# Patient Record
Sex: MALE | Race: WHITE | URBAN - METROPOLITAN AREA
[De-identification: names, ages, dates, MRNs, and addresses within clinical notes are randomized per-mention and may not be internally consistent; named-entity substitution may affect disease eponyms.]

---

## 2018-01-05 ENCOUNTER — TELEPHONE (OUTPATIENT)
Dept: DERMATOLOGY | Facility: AMBULATORY SURGERY CENTER | Age: 53
End: 2018-01-05

## 2018-01-05 NOTE — TELEPHONE ENCOUNTER
LVM regarding MOHs pre op assessment. Pt instructed to call back at earliest convenience.      Appointment on Mon 1/22 @ 1030  Site: L superior posterior helix

## 2018-01-15 ENCOUNTER — TELEPHONE (OUTPATIENT)
Dept: DERMATOLOGY | Facility: AMBULATORY SURGERY CENTER | Age: 53
End: 2018-01-15

## 2018-01-16 NOTE — TELEPHONE ENCOUNTER
Patient Appointment Date: Mon 1/22 @ [facility], 48 y.o., male  Is calling for their Mohs Pre-Op Assessment    does not have Hepatitis C   does not have HIV (If YES, set up consult appointment)  does confirm site (if pathology available)  does ID site. (Can they still visibly see the site)    Brief description of tumor: (symptoms, If prior treatment, duration)    Allergies: Allergies not on file      does not have an Electrical Implanted Device (Pacemaker, Defibrillator, AICD, brain stimulator)      does not need antibiotics      is not taking NSAIDs    is not taking aspirin      is not taking Garlic  is not taking Ginkgo  is not taking Ginseng  is not taking Fish oils  is not taking Vit E    does not take a blood thinner(i.e. Coumadin/Warfarin, Plavix, Brilinta, Pradaxa, Xarelto, Effient)    is not taking Coumadin/Warfarin (If taking needs to have PT/INR drawn and faxed results within a week of surgery)      Pre operative assessment questions asked to patient. Patient has a general understanding of the procedure, and has been versed that there will be local anesthesia used in the procedure and that He will be ok to drive themselves to and from the appointment.

## 2018-01-22 ENCOUNTER — OFFICE VISIT (OUTPATIENT)
Dept: DERMATOLOGY | Facility: AMBULATORY SURGERY CENTER | Age: 53
End: 2018-01-22

## 2018-01-22 VITALS
RESPIRATION RATE: 18 BRPM | WEIGHT: 162 LBS | OXYGEN SATURATION: 98 % | HEART RATE: 80 BPM | HEIGHT: 71 IN | SYSTOLIC BLOOD PRESSURE: 130 MMHG | DIASTOLIC BLOOD PRESSURE: 84 MMHG | TEMPERATURE: 97.7 F | BODY MASS INDEX: 22.68 KG/M2

## 2018-01-22 DIAGNOSIS — C44.219 BASAL CELL CARCINOMA OF HELIX OF LEFT EAR: Primary | ICD-10-CM

## 2018-01-22 NOTE — PROGRESS NOTES
This note is written by Francisco Rainey, as dictated by Thania Burns. Pennie Barrow MD.    CC: Basal cell carcinoma on the left posterior helix     History of present illness:     Shelley Brandon is a 48 y.o. male referred by Dr. Lissa Kwok. He has a biopsy-proven basal cell carcinoma on the left posterior helix. This is a new basal cell carcinoma present for more than six months described as a non-healing lesion with no prior treatment. Biopsy confirmed the diagnosis of basal cell carcinoma, and I reviewed the written pathology report. He is feeling well and in his usual state of health today. He has no pain, no current illnesses, no other skin concerns. His allergies, medications, medical, and social history are reviewed by me today. Exam:     He is an awake, alert, and oriented 48 y.o. male who appears well and in no distress. There is no preauricular, submandibular, or cervical lymphadenopathy. I examined his left ear. He has a 12 x 11 mm indistinct pink patch on his left posterior helix. He confirms location. Assessment/plan:    1. Basal cell carcinoma, left posterior helix. I discussed the diagnosis of basal cell carcinoma and summarized the pathology report. Mohs surgery is indicated by site, size, and poor definition. The procedure was discussed, verbal and written consent were obtained. I performed the procedure. Two stages were required to reach a tumor free plane. The surgical defect was managed with a full thickness skin graft. There were no complications. He will follow up in one week and as needed as the site heals. Indications, risks, and options were discussed with Shelley Brandon preoperatively. Risks including, but not limited to: pain, bleeding, infection, tumor recurrence, scarring and damage to motor and/or sensory nerves, were discussed. Shelley Brandon chose Mohs surgery. Shelley Brandon was an acceptable surgery candidate.     Shelley Branodn was placed in the appropriate position on the operating table in the Mohs surgery procedure room. The area was prepped and draped in the standard manner. Gentian violet was used to outline the clinical margins of the tumor. Local anesthesia was then obtained. The grossly visible tumor was then removed, an underlying layer was excised and mapped according to the Mohs technique, and the individual specimens examined microscopically. The process was repeated until microscopic examination of the tissue specimens confirmed a tumor-free plane. Hemostasis was obtained with electrosurgery and pressure. The wound was covered between stages with moist saline gauze. The wound management options of second intent healing, layered closure, local flap, or full thickness skin graft were discussed. Mr. Berhane Anand understands the aims, risks, alternatives, and possible complications and elects to proceed with a full thickness skin graft. Mr. Berhane Anand was placed in a supine position on the operating table in the Mohs surgery procedure room. The area was prepped and draped in the standard manner. A template of donor skin the size of the wound was drawn at the left postauricular. 1% lidocaine with epinephrine 1:100,000 was used to anesthetize the donor area. The graft was removed from the donor area and defatted. Hemostasis was obtained with spot electrocoagulation. The margins of the donor site were undermined and then the site was sutured with 4-0 polysorb sutures to approximate the skin edges. The defatted graft was placed on the recipient site and anchored in place with a running 5-0 fast absorbing gut suture around the margin. Steristrips were placed on the donor site. A pressure dressing utilizing Vaseline, Telfa, gauze and Coverroll was placed on the graft site. Wound care instructions (written and verbal) and a follow up appointment were given to the patient before discharge. Mr. Berhane Anand was discharged in good condition.                 2. History of nonmelanoma skin cancer. I discussed the diagnosis and recommend routine examinations with Dr. Jarrod Lopez for surveillance. The documentation recorded by the scribe accurately reflects the service I personally performed and the decisions made by me. [facility]   OFFICE PROCEDURE PROGRESS NOTE     Chart reviewed for the following:     Billy Pate MD, have reviewed the History, Physical and updated the Allergic reactions for [address] performed immediately prior to start of procedure:     Billy Quevedo. Pat Pate MD, have performed the following reviews on [place] prior to the start of the procedure:     * Patient was identified by name and date of birth   * Agreement on procedure being performed was verified   * Risks and Benefits explained to the patient   * Procedure site verified and marked as necessary   * Patient was positioned for comfort   * Consent was signed and verified     Time: 10:21 AM   Date of procedure: 1/22/2018  Procedure performed by: Susie Mathews.  Pat Pate MD   Provider assisted by: RN  Patient assisted by: self   How tolerated by patient: tolerated the procedure well with no complications   Comments: none

## 2018-01-22 NOTE — PATIENT INSTRUCTIONS
WOUND CARE INSTRUCTIONS    1. Keep the dressing clean and dry and do not remove for 24/48 hours. 2. Then change the dressing once a day as follows:  a. Wash hands before and after each dressing change. b. Remove dressing and wash site gently with mild soap and water, rinse, and pat dry.  c. Apply an ointment (Bacitracin, Polysporin, Neosporin, Petroleum jelly or Aquaphor). d. Apply a non-stick (Telfa) dressing or Band-Aid to cover the wound. Keep bandage on graft clean and dry for 1 week until follow up appointment. Remove pressure bandage on Wednesday and wash gently. Leave steri-strips in place until it naturally begins to peel off.       3. Watch for:  BLEEDING: A small amount of drainage may occur. If bleeding occurs, elevate and rest the surgery site. Apply gauze and steady pressure for 15 minutes. If bleeding continues, call this office. INFECTION: Signs of infection include increased redness, pain, warmth, drainage of pus, and fever. If this occurs, call this office. 4. Special Instructions (follow any that are checked):  · [x] You have stitches that DO NOT need to be removed. · [x] Avoid bending at the waist and heavy lifting for two days. · [x] Sleep with your head elevated for the next two nights. · [x] Rest the surgery site and keep it elevated as much as possible for two days. · [x] You may apply an ice-pack for 10-15 minutes every waking hour for the rest of the day. · [] Eat a soft diet and avoid hot food and hot drinks for the rest of the day. · [] Other instructions: Follow up as directed. Take Tylenol or Ibuprofen for pain as needed. Once the site is healed with no remaining bandages or open areas, protect your surgical site and scar from the sun, as this area will be more sensitive. Use a broad spectrum sunscreen SPF 30 or higher daily, and a chemical free product (one containing zinc oxide or titanium dioxide) is a good choice if the area is sensitive.     You may begin to gently massage the surgical site in 2-3 weeks, rubbing in a circular motion along the scar. This can help reduce swelling and thickness of a scar. A scar cream may be used beginnning 1 month after the surgery. If you have any questions or concerns, please call our office Monday through Friday at [phone].

## 2018-01-22 NOTE — PROGRESS NOTES
Pre-op: Patient presents today for the evaluation of BCC to the L posterior helix. Procedure explained with full understanding. Vitals:    01/22/18 1034   BP: 130/84   Pulse: 80   Resp: 18   Temp: 97.7 °F (36.5 °C)   TempSrc: Oral   SpO2: 98%   Weight: 73.5 kg (162 lb)   Height: 5' 11\" (1.803 m)     preoperatively, will continue to monitor. Post-op: Written and verbal post-op wound care instructions given to patient with full understanding of care. Surgical wound bandaged with Vaseline, Telfa, 2x2 gauze, and coverall tape. All questions and concerns addressed. Vitals stable postoperatively.

## 2018-01-29 ENCOUNTER — OFFICE VISIT (OUTPATIENT)
Dept: DERMATOLOGY | Facility: AMBULATORY SURGERY CENTER | Age: 53
End: 2018-01-29

## 2018-01-29 VITALS
OXYGEN SATURATION: 98 % | HEIGHT: 71 IN | RESPIRATION RATE: 16 BRPM | WEIGHT: 162.04 LBS | BODY MASS INDEX: 22.69 KG/M2 | HEART RATE: 79 BPM

## 2018-01-29 DIAGNOSIS — Z94.5 HISTORY OF SKIN GRAFT: ICD-10-CM

## 2018-01-29 DIAGNOSIS — Z85.828 HISTORY OF BASAL CELL CARCINOMA EXCISION: Primary | ICD-10-CM

## 2018-01-29 DIAGNOSIS — Z98.890 HISTORY OF BASAL CELL CARCINOMA EXCISION: Primary | ICD-10-CM

## 2018-01-29 NOTE — PROGRESS NOTES
This note was written by Deuce Armenta, as dictated by Yasir Engle MD.     Chief complaint: Wound check on the left posterior helix     HPI: Filipe Lee presents for wound check following Mohs surgery performed on 1/22/18. I treated a basal cell carcinoma on his left posterior helix. The surgical site was managed with a full thickness skin graft. He reports minor bleeding from the graft site one day, none recently. He's had no pain. Exam: The graft site was examined. There is not evidence of infection. There is not erythema. There is not edema. Assessment/Plan:      1. Wound check, left posterior helix. The graft site is healing well. Additional care was reviewed. I recommended the use of Vaseline for an additional 10 days on the graft site. Follow up will be as needed. The documentation recorded by the scribe accurately reflects the service I personally performed and the decisions made by me.

## 2023-11-26 ENCOUNTER — HOSPITAL ENCOUNTER (EMERGENCY)
Facility: HOSPITAL | Age: 58
Discharge: HOME | End: 2023-11-26
Attending: STUDENT IN AN ORGANIZED HEALTH CARE EDUCATION/TRAINING PROGRAM
Payer: COMMERCIAL

## 2023-11-26 VITALS
TEMPERATURE: 97.8 F | BODY MASS INDEX: 23.3 KG/M2 | DIASTOLIC BLOOD PRESSURE: 98 MMHG | WEIGHT: 166.45 LBS | HEART RATE: 90 BPM | HEIGHT: 71 IN | RESPIRATION RATE: 16 BRPM | SYSTOLIC BLOOD PRESSURE: 144 MMHG | OXYGEN SATURATION: 100 %

## 2023-11-26 DIAGNOSIS — R21 RASH AND OTHER NONSPECIFIC SKIN ERUPTION: Primary | ICD-10-CM

## 2023-11-26 LAB
BASOPHILS # BLD: 0 K/UL (ref 0–0.1)
BASOPHILS NFR BLD: 0 % (ref 0–1)
DIFFERENTIAL METHOD BLD: ABNORMAL
EOSINOPHIL # BLD: 0.2 K/UL (ref 0–0.4)
EOSINOPHIL NFR BLD: 9 % (ref 0–7)
ERYTHROCYTE [DISTWIDTH] IN BLOOD BY AUTOMATED COUNT: 12.7 % (ref 11.5–14.5)
HCT VFR BLD AUTO: 45.4 % (ref 36.6–50.3)
HGB BLD-MCNC: 15.4 G/DL (ref 12.1–17)
IMM GRANULOCYTES # BLD AUTO: 0 K/UL (ref 0–0.04)
IMM GRANULOCYTES NFR BLD AUTO: 1 % (ref 0–0.5)
LYMPHOCYTES # BLD: 0.2 K/UL (ref 0.8–3.5)
LYMPHOCYTES NFR BLD: 10 % (ref 12–49)
MCH RBC QN AUTO: 31.2 PG (ref 26–34)
MCHC RBC AUTO-ENTMCNC: 33.9 G/DL (ref 30–36.5)
MCV RBC AUTO: 91.9 FL (ref 80–99)
MONOCYTES # BLD: 0.3 K/UL (ref 0–1)
MONOCYTES NFR BLD: 12 % (ref 5–13)
NEUTS SEG # BLD: 1.7 K/UL (ref 1.8–8)
NEUTS SEG NFR BLD: 68 % (ref 32–75)
NRBC # BLD: 0 K/UL (ref 0–0.01)
NRBC BLD-RTO: 0 PER 100 WBC
PLATELET # BLD AUTO: 140 K/UL (ref 150–400)
PMV BLD AUTO: 9 FL (ref 8.9–12.9)
RBC # BLD AUTO: 4.94 M/UL (ref 4.1–5.7)
RBC MORPH BLD: ABNORMAL
WBC # BLD AUTO: 2.4 K/UL (ref 4.1–11.1)

## 2023-11-26 PROCEDURE — 36415 COLL VENOUS BLD VENIPUNCTURE: CPT

## 2023-11-26 PROCEDURE — 99283 EMERGENCY DEPT VISIT LOW MDM: CPT

## 2023-11-26 PROCEDURE — 85025 COMPLETE CBC W/AUTO DIFF WBC: CPT

## 2023-11-26 RX ORDER — CETIRIZINE HYDROCHLORIDE 10 MG/1
10 TABLET ORAL DAILY
Qty: 30 TABLET | Refills: 0 | Status: SHIPPED | OUTPATIENT
Start: 2023-11-26 | End: 2023-12-26

## 2023-11-26 RX ORDER — PREDNISONE 10 MG/1
TABLET ORAL
Qty: 30 TABLET | Refills: 0 | Status: SHIPPED | OUTPATIENT
Start: 2023-11-26

## 2023-11-26 ASSESSMENT — LIFESTYLE VARIABLES
HOW OFTEN DO YOU HAVE A DRINK CONTAINING ALCOHOL: 4 OR MORE TIMES A WEEK
HOW MANY STANDARD DRINKS CONTAINING ALCOHOL DO YOU HAVE ON A TYPICAL DAY: 1 OR 2

## 2023-11-26 NOTE — ED NOTES
Discharge instructions given to patient, patient verbalized understanding      Brynn Hdez RN  11/26/23 7045

## 2023-11-26 NOTE — DISCHARGE INSTRUCTIONS
Discussed visit today. We will start you on a longer steroid and have you follow-up with Dermatology for further evaluation. Return to the ER with any worsening of symptoms.

## 2023-11-26 NOTE — ED PROVIDER NOTES
[facility]  EMERGENCY DEPARTMENT ENCOUNTER      Pt Name: Taina Alegria  MRN: [id number]  [address] 1965  Date of evaluation: 11/26/2023  Provider: Eric Lee PA-C    CHIEF COMPLAINT       Chief Complaint   Patient presents with    Rash         HISTORY OF PRESENT ILLNESS    Patient is an 62 y.o. male with history of basel cell carcinoma removal who presents to the ER with reports of a rash since 11/13. Patient reports the rash was initially to his chest, back, upper and lower extremities. [facility] was [facility] at [facility] and was given Pepcid, hydroxyzine, and Bactrim for 7 days. Patient reports his rash started to get better, but after stopping the medications it came back. Patient reports he went to [facility] today and was told to come to the ER to rule out allergic reaction to the medication since the rash came back. Patient reports that the rash has been erythematous and itchy. Patient reports the rash on his lower extremities is gone. Patient reports the rash got better but then came back. Patient reports he did have a fever last night of 101 and took Advil which helped decrease the temperature. Patient denies any new medications, detergents, lotions, or food. Patient denies chest pain, shortness of breath, abdominal pain, nausea or vomiting, diarrhea or constipation, chills, headache, dizziness, or lightheadedness. Patient denies urinary symptoms, trouble breathing, scratchy throat, or trouble swallowing. Nursing Notes were reviewed. REVIEW OF SYSTEMS       Review of Systems   Constitutional:  Positive for fever. Skin:  Positive for rash. All other systems reviewed and are negative. PAST MEDICAL HISTORY   History reviewed. No pertinent past medical history. SURGICAL HISTORY     History reviewed. No pertinent surgical history.       CURRENT MEDICATIONS       Discharge Medication List as of 11/26/2023  1:08 PM          ALLERGIES     Patient has no known

## 2023-11-26 NOTE — ED TRIAGE NOTES
Rash chest and back since 11/13/23, hs been treated [facility] and rash keeps coming back. He has been on prednisone and  abx. Still no relief. Rash is red, has raised bumps, very itchy and all over back/Chest. No pustules, not along any specific dermatome. Yesterday, pt did develop a fever and was 101.0 and today 99.